# Patient Record
(demographics unavailable — no encounter records)

---

## 2024-12-12 NOTE — DISCUSSION/SUMMARY
[Normal Growth] : growth [Normal Development] : development [None] : No known medical problems [No Elimination Concerns] : elimination [No Feeding Concerns] : feeding [No Skin Concerns] : skin [Normal Sleep Pattern] : sleep [Communication and Social Development] : communication and social development [Sleep Routines and Issues] : sleep routines and issues [Temper Tantrums and Discipline] : temper tantrums and discipline [Healthy Teeth] : healthy teeth [Safety] : safety [No Medications] : ~He/She~ is not on any medications [Parent/Guardian] : parent/guardian [] : The components of the vaccine(s) to be administered today are listed in the plan of care. The disease(s) for which the vaccine(s) are intended to prevent and the risks have been discussed with the caretaker.  The risks are also included in the appropriate vaccination information statements which have been provided to the patient's caregiver.  The caregiver has given consent to vaccinate. [FreeTextEntry1] : 16 month female here for well visit. Normal growth and development observed unless otherwise listed. Continue whole cow's milk. Continue table foods, 3 meals with 2-3 snacks per day. Brush teeth twice a day with soft toothbrush. Recommend visit to dentist. When in car, keep child in rear-facing car seats until age 2, or until  the maximum height and weight for seat is reached. Put baby to sleep in own crib. Lower crib mattress. Help baby to maintain consistent daily routines and sleep schedule. Recognize stranger and separation anxiety. Ensure home is safe since baby is increasingly mobile. Be within arm's reach of baby at all times. Use consistent, positive discipline. Read aloud to baby.  Return in 3 months for 18 month well child check.   No contraindications to routine immunizations while on rifampin as far as I can see in the literature, reviewed case with Dr Palacios who agrees.  Vaccine Information Sheet(s) given for appropriate vaccines. The components of the vaccine(s) to be administered today are listed in the plan of care. We discussed common side effects and education on the vaccine was provided including the disease(s) for which the vaccine(s) are intended to prevent as well as any risks. Denies any questions. Consent was given to vaccinate. MMR and flu #1 given by LPN. Return in 1 month for flu #2 and varicella.  Routine bloodwork done at 12 months and WNL-- will repeat urine if possible. Mom given script.

## 2024-12-12 NOTE — PHYSICAL EXAM
[Alert] : alert [No Acute Distress] : no acute distress [Normocephalic] : normocephalic [Anterior [place] Closed] : anterior fontanelle closed [Red Reflex Bilateral] : red reflex bilateral [PERRL] : PERRL [Normally Placed Ears] : normally placed ears [Auricles Well Formed] : auricles well formed [Clear Tympanic membranes with present light reflex and bony landmarks] : clear tympanic membranes with present light reflex and bony landmarks [No Discharge] : no discharge [Nares Patent] : nares patent [Palate Intact] : palate intact [Uvula Midline] : uvula midline [Tooth Eruption] : tooth eruption  [Supple, full passive range of motion] : supple, full passive range of motion [No Palpable Masses] : no palpable masses [Symmetric Chest Rise] : symmetric chest rise [Clear to Auscultation Bilaterally] : clear to auscultation bilaterally [Regular Rate and Rhythm] : regular rate and rhythm [S1, S2 present] : S1, S2 present [No Murmurs] : no murmurs [+2 Femoral Pulses] : +2 femoral pulses [Soft] : soft [NonTender] : non tender [Non Distended] : non distended [Normoactive Bowel Sounds] : normoactive bowel sounds [No Hepatomegaly] : no hepatomegaly [No Splenomegaly] : no splenomegaly [Tulio 1] : Tulio 1 [No Clitoromegaly] : no clitoromegaly [Normal Vaginal Introitus] : normal vaginal introitus [Patent] : patent [Normally Placed] : normally placed [No Abnormal Lymph Nodes Palpated] : no abnormal lymph nodes palpated [No Clavicular Crepitus] : no clavicular crepitus [Negative Arshad-Ortalani] : negative Arshad-Ortalani [Symmetric Buttocks Creases] : symmetric buttocks creases [No Spinal Dimple] : no spinal dimple [NoTuft of Hair] : no tuft of hair [Cranial Nerves Grossly Intact] : cranial nerves grossly intact [No Rash or Lesions] : no rash or lesions

## 2024-12-12 NOTE — HISTORY OF PRESENT ILLNESS
[Normal] : Normal [Water heater temperature set at <120 degrees F] : Water heater temperature set at <120 degrees F [Car seat in back seat] : Car seat in back seat [Carbon Monoxide Detectors] : Carbon monoxide detectors [Smoke Detectors] : Smoke detectors [Mother] : mother [Cow's milk (Ounces per day ___)] : consumes [unfilled] oz of cow's milk per day [Fruit] : fruit [Vegetables] : vegetables [Meat] : meat [Cereal] : cereal [Eggs] : eggs [Finger Foods] : finger foods [Table food] : table food [___ stools per day] : [unfilled]  stools per day [In crib] : In crib [Pacifier use] : Pacifier use [Brushing teeth] : Brushing teeth [Playtime] : Playtime [Temper Tantrums] : Temper tantrums [No] : Not at  exposure [Exposure to electronic nicotine delivery system] : No exposure to electronic nicotine delivery system [Up to date] : Up to date [FreeTextEntry7] : Started on Rifampin for latent TB (father was hospitalized for 35 days for active TB), mom and brother also with latent TB [de-identified] : Loves meat, good eater [FreeTextEntry1] : 16 month old female here for 15 month well visit. Denies any specialist visits, ER visits, hospitalizations or serious injuries since last well visit unless listed below.  Possible grade 1 murmur heard last visit but due to patient crying it was hard to discern for sure On Rifampin (recently started on it) for 4 months due to latent TB being managed by [facility]. Father was hospitalized for active TB. She has had no symptoms at all.

## 2025-01-12 NOTE — DISCUSSION/SUMMARY
[Normal Growth] : growth [Normal Development] : development [None] : No known medical problems [No Elimination Concerns] : elimination [No Feeding Concerns] : feeding [No Skin Concerns] : skin [Normal Sleep Pattern] : sleep [Communication and Social Development] : communication and social development [Sleep Routines and Issues] : sleep routines and issues [Temper Tantrums and Discipline] : temper tantrums and discipline [Healthy Teeth] : healthy teeth [Safety] : safety [No medication Changes] : No medication changes at this time [Parent/Guardian] : parent/guardian [] : The components of the vaccine(s) to be administered today are listed in the plan of care. The disease(s) for which the vaccine(s) are intended to prevent and the risks have been discussed with the caretaker.  The risks are also included in the appropriate vaccination information statements which have been provided to the patient's caregiver.  The caregiver has given consent to vaccinate. [FreeTextEntry1] : Patient is a 17 month girl here for routine visit. Diet,development,safety issues were discussed.Vaccine schedule was discussed.Possible side effects were discussed. vaccines given today includedVaricella. Flu #2. Still needs PCV20 #4 and Dtap. 17 month female growing and developing well. Continue whole cow's milk. Continue table foods, 3 meals with 2-3 snacks per day. Incorporate  water daily in a sippy cup. Brush teeth twice a day with soft toothbrush. Recommend visit to dentist. When in car, keep child in rear-facing car seats until age 2, or until  the maximum height and weight for seat is reached. Put toddler to sleep in own bed or crib. Help toddler to maintain consistent daily routines and sleep schedule. Toilet training discussed. Recognize anxiety in new settings. Ensure home is safe. Be within arm's reach of toddler at all times. Use consistent, positive discipline. Read aloud to toddler.  Plan to return to office for 24 month well child visit and sooner if needed. I recommended that the patient participates in 60 minutes or more of physical activity a day.  As a -aged child, most physical activity will be unstructured; outdoor play is particularly helpful. Encouraged physical activity with playground time in addition to discouraging sedentary time (television use)..

## 2025-01-12 NOTE — PHYSICAL EXAM
[Alert] : alert [No Acute Distress] : no acute distress [Normocephalic] : normocephalic [Red Reflex Bilateral] : red reflex bilateral [Anterior [place] Closed] : anterior fontanelle closed [PERRL] : PERRL [Normally Placed Ears] : normally placed ears [Auricles Well Formed] : auricles well formed [Clear Tympanic membranes with present light reflex and bony landmarks] : clear tympanic membranes with present light reflex and bony landmarks [No Discharge] : no discharge [Nares Patent] : nares patent [Palate Intact] : palate intact [Uvula Midline] : uvula midline [Tooth Eruption] : tooth eruption  [Supple, full passive range of motion] : supple, full passive range of motion [No Palpable Masses] : no palpable masses [Symmetric Chest Rise] : symmetric chest rise [Clear to Auscultation Bilaterally] : clear to auscultation bilaterally [Regular Rate and Rhythm] : regular rate and rhythm [S1, S2 present] : S1, S2 present [No Murmurs] : no murmurs [+2 Femoral Pulses] : +2 femoral pulses [Soft] : soft [NonTender] : non tender [Normoactive Bowel Sounds] : normoactive bowel sounds [Non Distended] : non distended [No Hepatomegaly] : no hepatomegaly [No Splenomegaly] : no splenomegaly [Tulio 1] : Tulio 1 [No Clitoromegaly] : no clitoromegaly [Normal Vaginal Introitus] : normal vaginal introitus [Patent] : patent [Normally Placed] : normally placed [No Abnormal Lymph Nodes Palpated] : no abnormal lymph nodes palpated [No Clavicular Crepitus] : no clavicular crepitus [Symmetric Buttocks Creases] : symmetric buttocks creases [No Spinal Dimple] : no spinal dimple [NoTuft of Hair] : no tuft of hair [Cranial Nerves Grossly Intact] : cranial nerves grossly intact [No Rash or Lesions] : no rash or lesions

## 2025-01-12 NOTE — HISTORY OF PRESENT ILLNESS
[Mother] : mother [Vegetables] : vegetables [Fruit] : fruit [Meat] : meat [Finger Foods] : finger foods [Table food] : table food [Normal] : Normal [___ voids per day] : [unfilled] voids per day [Sippy cup use] : Sippy cup use [Brushing teeth] : Brushing teeth [Vitamin] : Primary Fluoride Source: Vitamin [Playtime] : Playtime  [Temper Tantrums] : Temper Tantrums [No] : Not at  exposure [Water heater temperature set at <120 degrees F] : Water heater temperature set at <120 degrees F [Car seat in back seat] : Car seat in back seat [Carbon Monoxide Detectors] : Carbon monoxide detectors [Exposure to electronic nicotine delivery system] : No exposure to electronic nicotine delivery system [Smoke Detectors] : Smoke detectors [Up to date] : Up to date [FreeTextEntry7] : ANN-MARIE SAUCEDA  17 month female   here for routine visit. Doing well. [NO] : No [FreeTextEntry1] : Patient is here today for a routine well visit. Denies any new visits to specialists,ER visits, hospitalizations or serious injuries since last visit unless listed below. Latent TB. On Rifampin. Father had active TB. Mother and sibling with latent TB

## 2025-01-21 NOTE — DISCUSSION/SUMMARY
[FreeTextEntry1] : 17 month girl is here today for immunization update. patient is doing well. Vaccine listed discussed and given. VIS given. Possible side effects discussed. Received varicella and FLU> [] : The components of the vaccine(s) to be administered today are listed in the plan of care. The disease(s) for which the vaccine(s) are intended to prevent and the risks have been discussed with the caretaker.  The risks are also included in the appropriate vaccination information statements which have been provided to the patient's caregiver.  The caregiver has given consent to vaccinate.

## 2025-01-21 NOTE — HISTORY OF PRESENT ILLNESS
[Exposure to electronic nicotine delivery system] : No exposure to electronic nicotine delivery system [FreeTextEntry7] : ANN-MARIE SAUCEDA  17 month female   here for routine visit. Doing well. [Varicella] : Varicella [Influenza] : Influenza [FreeTextEntry1] : 17 month girl is here today for immunization update. patient is doing well. Vaccine listed discussed and  given. Varicella and FLU.